# Patient Record
Sex: FEMALE | Race: WHITE
[De-identification: names, ages, dates, MRNs, and addresses within clinical notes are randomized per-mention and may not be internally consistent; named-entity substitution may affect disease eponyms.]

---

## 2018-06-13 ENCOUNTER — HOSPITAL ENCOUNTER (OUTPATIENT)
Dept: HOSPITAL 68 - STS | Age: 63
Setting detail: OBSERVATION
LOS: 1 days | End: 2018-06-14
Attending: SURGERY | Admitting: SURGERY
Payer: COMMERCIAL

## 2018-06-13 VITALS — DIASTOLIC BLOOD PRESSURE: 88 MMHG | SYSTOLIC BLOOD PRESSURE: 126 MMHG

## 2018-06-13 VITALS — BODY MASS INDEX: 30.73 KG/M2 | WEIGHT: 180 LBS | HEIGHT: 64 IN

## 2018-06-13 VITALS — SYSTOLIC BLOOD PRESSURE: 110 MMHG | DIASTOLIC BLOOD PRESSURE: 80 MMHG

## 2018-06-13 VITALS — SYSTOLIC BLOOD PRESSURE: 110 MMHG | DIASTOLIC BLOOD PRESSURE: 82 MMHG

## 2018-06-13 DIAGNOSIS — E78.2: ICD-10-CM

## 2018-06-13 DIAGNOSIS — K21.0: ICD-10-CM

## 2018-06-13 DIAGNOSIS — E03.9: ICD-10-CM

## 2018-06-13 DIAGNOSIS — K44.0: Primary | ICD-10-CM

## 2018-06-13 PROCEDURE — G0378 HOSPITAL OBSERVATION PER HR: HCPCS

## 2018-06-13 PROCEDURE — C1781 MESH (IMPLANTABLE): HCPCS

## 2018-06-13 NOTE — PATIENT DISCHARGE INSTRUCTIONS
Discharge Instructions
 
General Discharge Information
You were seen/treated for:
Hiatal hernia
You had these procedures:
Robotic assisted laparoscopic hiatal hernia repair with mesh and Nissen 
fundoplication on 6/13/18
Watch for these problems:
Increased pain, fever > 101.3, chills, redness, swelling or drainage from 
incisions
Other wound care:
Keep incisions clean and dry
Ok to remove dressings in 48 hours
 
Diet
Continue normal diet: No
Recommended Diet: Fundoplication diet
 
Activity
Full Activity/No Limits: No
Activity Self Limited: Yes
Pounds, do NOT lift more than: 10 (x 4 weeks)
Other activity limits:
No heavy lifting or strenous activity
 
Acute Coronary Syndrome
 
Inclusion Criteria
At DC or during hospital stay patient has or had the following:
ACS DIAGNOSIS No
 
Discharge Core Measures
Meds if any: Prescribed or Continued at Discharge
Meds if any: NOT Prescribed or Continued at Discharge
 
Congestive Heart Failure
 
Inclusion Criteria
At DC or during hospital stay patient has or had the following:
CHF DIAGNOSIS No
 
Discharge Core Measures
Meds if any: Prescribed or Continued at Discharge
Meds if any: NOT Prescribed or Continued at Discharge
 
Cerebrovascular accident
 
Inclusion Criteria
At DC or during hospital stay patient has or had the following:
CVA/TIA Diagnosis No
 
Discharge Core Measures
Meds if any: Prescribed or Continued at Discharge
Meds if any: NOT Prescribed or Continued at Discharge
 
Venous thromboembolism
 
Inclusion Criteria
VTE Diagnosis No
VTE Type NONE
VTE Confirmed by (Test) NONE
 
Discharge Core Measures
- Per Current guidelines, there needs to be overlap
- treatment for the first 5 days of Warfarin therapy.
- If discharged on Warfarin prior to 5 days of
- overlap therapy, the patient will need to be
- assessed for post discharge needs including
- *Post discharge parental anticoagulation
- *Warfarin and/or parental anticoagulation education
- *Follow up date to check INR post discharge
At least 5 days overlap therapy as Inpatient No
Meds if any: Prescribed or Continued at Discharge
Note: Overlap Therapy is Warfarin and Anticoagulant
Meds if any: NOT Prescribed or Continued at Discharge

## 2018-06-13 NOTE — PN- GENERAL SURGERY
Subjective
Subjective:
Post op check:
Patient complaining of discomfort across chest that is moving down to epigastric
region.  Has been present since surgery.  No shortness of breath.  No nausea or 
vomitting.  Has sub q emphysema but feels facial swelling is improving.
 
Objective
Vital Signs and I&Os
Vital Signs
 
 
Date Time Temp Pulse Resp B/P B/P Pulse O2 O2 Flow FiO2
 
     Mean Ox Delivery Rate 
 
06/13 1714 97.9 73 18 110/80  91 Room Air  
 
 
 Intake & Output
 
 
 06/13 1600 06/13 0800 06/13 0000 06/12 1600 06/12 0800 06/12 0000
 
Intake Total      
 
Output Total      
 
Balance      
 
       
 
Patient    180 lb  
 
Weight      
 
 
 
Physical Exam:
General:  Alert and oriented x3, no acute distress
HEENT:  Sub q emphysema palpable across chest, shoulders and face
Cards:  RRR, s1s2
Pulm:  CTA bilaterally, non-labored respiratory effort
Abd: Softly distended, domo-incisional tenderness
Extremiteis:  Neurovascularly inact.  Bilateral calves soft and non-tender
 
Assessment/Plan
Assessment/Plan
This is a 62 year old female, POD 0 s/p robotic assited nissen/hh repair
 
-offirmev atc
-prn oxycodone, morphine
-resume meloxicam on pod 1 if clinically stable
-monitor cp, cxr if worsens, trops negative
-OOB encouraged
-fundoplication diet
Will discuss poc with Dr. Perdue
 
 
Core Measures
 
Venous Thromboembolism
VTE Risk Factors Surgery
No Mechanical VTE Prophylaxis d/t N/A MechProphylax Ordered
No VTE Pharm Prophylaxis d/t NA PharmProphylax ordered

## 2018-06-13 NOTE — ADMISSION CORE MEASURES
Acute Coronary Syndrome (CM)
 
ACS Core Measures
Acute Coronary Syndrome Diagnosis No
 
Congestive Heart Failure (NEW)
 
CHF Core Measures
Congestive Heart Failure Diagnosis No
 
Cerebrovascular Accident AUG17
 
CVA Core Measures
CVA/TIA Diagnosis No
 
Venous Thromboembolism AUG17
 
VTE Core Everardo (View Protocol)
VTE Risk Factors Surgery
No Mechanical VTE Prophylaxis d/t N/A MechProphylax Ordered
No VTE Pharm Prophylaxis d/t NA PharmProphylax ordered
 
Problem List
 
As ranked by this Provider
includes Assessment & Plan
 1. GERD with esophagitis
 
 2. Hiatal hernia with obstruction but no gangrene
 
 
HOME MEDS
Home Med List
Atorvastatin Calcium 20 MG TABLET   1 TAB PO DAILY CHOLESTEROL  (Reported)
Cholecalciferol (Vitamin D3) (Vitamin D3) (Unknown Strength) CAPSULE   (Unknown 
Dose) PO DAILY SUPPLEMENT  (Reported)
Meloxicam 15 MG TABLET   1 TAB PO DAILY PAIN/INFLAMMATION  (Reported)
Pantoprazole Sodium 40 MG TABLET.DR   1 TAB PO BID GI  (Reported)
Vit C/E/Zn/Coppr/Lutein/Zeaxan (Preservision Areds 2 Softgel) 250-200-40 CAPSULE
  1 CAP PO BID SUPPLEMENT  (Reported)

## 2018-06-13 NOTE — OPERATIVE REPORT
Operative/Inv Procedure Report
Surgery Date: 06/13/18
Name of Procedure:
Robotic assisted laparoscopic hiatal hernia repair with mesh and Nissen 
fundoplication
Pre-Operative Diagnosis:
Hiatal hernia with obstruction
 
GERD with esophagitis
Post-Operative Diagnosis:
Same same
Estimated Blood Loss: scant
Surgeon/Assistant:
Iron JOHNSON,Yobani WOO/Obey KOCH
 
Anesthesia: general endotracheal tube
Implants:
[place] bio a
 
Operative/Procedure Note
Note:
After informed consent patient brought to the operating room laid supine.  
General anesthesia was obtained and her abdomen was prepped and draped.  
Pneumoperitoneum was achieved through a left upper quadrant varies needle.  A 8 
mm port was placed to the left of the periumbilical region.  The abdomen was 
explored.  There was some pneumatosis of the mesentery of the small bowel in the
left upper quadrant.  The varies needle was removed.  There is no bowel injury. 
2, 8 mm ports were placed in the left lower abdomen parallel plane of the 
diaphragm under vision.  A 12 mm port was placed in the right midabdomen under 
direct vision a camera.  A 5 mm Carisa retractor was placed through the 
epigastric region under direct vision a camera.  The liver was retracted and 
fixed to the bed.  She was placed in reverse Trendelenburg, 15.  Robot was 
docked and targeted.  I then broke scrub and went to the console.  There is a 
very large mixed paraesophageal hernia containing the entire fundus and most of 
the body of the stomach and most of the omentum.  Began dissection on the right 
allan.  The peritoneum overlying it was taken down with scissor cautery.  We came
anteriorly and got into a plane around the hernia sac.  We circumferentially 
dissected the hernia sac and delivered the sac inferiorly.  Then dissected 
posterior and created a plane behind the esophagus.  Penrose drain was then 
placed and tied to itself and aid in dissection in the GE junction.  The 
esophagus was then dissected from the mediastinum using scissor cautery.  The 
vagus nerves were identified and preserved.  Once the esophagus was mobilized as
far as we can go, we turned our attention down to the GE junction.  It was 
difficult to see where it was so the sac was excised with vessel sealer.  The GE
junction was well below the diaphragm.  The short gastric vessels were then 
taken down with the vessel sealer.  At this point the diaphragmatic crura were 
closed with a running 0 V lock suture, nonabsorbable.  At the apex of her 
closure a 0 Ethibond suture was placed in a figure-of-eight fashion for 
additional strength.  The fundus was then placed posterior and shoeshine 
maneuver performed.  The fundus was attached to itself anteriorly with a 0 V 
lock suture, nonabsorbable.  This aid in placement of the fundus then allowed 2 
more sutures to be placed inferiorly using interrupted 2-0 Vicryl incorporating 
the anterior portion of the esophagus to keep the fundoplication fixated.  A 
piece of [place] bio a mesh was then placed in the cavity and positioned over the 
diaphragmatic closure.  All needles and Penrose drains were then removed.  The 
sac was removed and passed off the field.  Liver retractor was removed and ports
delivered.  The fascia in the 12 mm site was closed with 0 Vicryl suture.  Skin 
incisions closed with 4-0 Vicryl.  Steri-Strips and sterile dressing applied.  
Sponge and needle counts are correct
CC:
Michele JOHNSON,Mirtha; Will JOHNSON,Spencer

## 2018-06-14 VITALS — SYSTOLIC BLOOD PRESSURE: 128 MMHG | DIASTOLIC BLOOD PRESSURE: 76 MMHG

## 2018-06-14 VITALS — DIASTOLIC BLOOD PRESSURE: 90 MMHG | SYSTOLIC BLOOD PRESSURE: 140 MMHG

## 2018-06-14 VITALS — SYSTOLIC BLOOD PRESSURE: 124 MMHG | DIASTOLIC BLOOD PRESSURE: 86 MMHG

## 2018-06-14 VITALS — DIASTOLIC BLOOD PRESSURE: 80 MMHG | SYSTOLIC BLOOD PRESSURE: 126 MMHG

## 2018-06-14 LAB
ABSOLUTE GRANULOCYTE CT: 10.2 /CUMM (ref 1.4–6.5)
BASOPHILS # BLD: 0 /CUMM (ref 0–0.2)
BASOPHILS NFR BLD: 0.1 % (ref 0–2)
EOSINOPHIL # BLD: 0 /CUMM (ref 0–0.7)
EOSINOPHIL NFR BLD: 0.1 % (ref 0–5)
ERYTHROCYTE [DISTWIDTH] IN BLOOD BY AUTOMATED COUNT: 16.4 % (ref 11.5–14.5)
GRANULOCYTES NFR BLD: 85 % (ref 42.2–75.2)
HCT VFR BLD CALC: 33.5 % (ref 37–47)
LYMPHOCYTES # BLD: 0.8 /CUMM (ref 1.2–3.4)
MCH RBC QN AUTO: 28 PG (ref 27–31)
MCHC RBC AUTO-ENTMCNC: 33.3 G/DL (ref 33–37)
MCV RBC AUTO: 84.4 FL (ref 81–99)
MONOCYTES # BLD: 0.9 /CUMM (ref 0.1–0.6)
PLATELET # BLD: 201 /CUMM (ref 130–400)
PMV BLD AUTO: 8 FL (ref 7.4–10.4)
RED BLOOD CELL CT: 3.97 /CUMM (ref 4.2–5.4)
WBC # BLD AUTO: 12 /CUMM (ref 4.8–10.8)

## 2018-06-14 NOTE — RADIOLOGY REPORT
EXAMINATION:
XR CHEST
 
CLINICAL INFORMATION:
Shortness of breath. History of Herbert fundoplication. No subcutaneous
emphysema.
 
COMPARISON:
None
 
TECHNIQUE:
2 views of the chest were obtained. Examination limited secondary to
extensive subcutaneous emphysema.
 
FINDINGS:
Cardiac silhouette is nondilated. There is widening of the superior
mediastinum, potentially related to recent surgery or patient angulation.
 
The lungs are adequately aerated. Lung detail is obscured secondary to
extensive subcutaneous emphysema. No gross lobar consolidation or pleural
effusion.
 
Degenerative changes of the spine.
 
IMPRESSION:
Extensive subcutaneous emphysema is present which limits fine detail. There
is widening of the superior mediastinum which is nonspecific but potentially
related to recent surgery or patient angulation. Clinical correlation is
recommended. Further evaluation may be obtained with CT if indicated.

## 2018-08-20 ENCOUNTER — HOSPITAL ENCOUNTER (INPATIENT)
Dept: HOSPITAL 68 - SDA | Age: 63
LOS: 2 days | DRG: 328 | End: 2018-08-22
Attending: SURGERY | Admitting: SURGERY
Payer: COMMERCIAL

## 2018-08-20 VITALS — HEIGHT: 63.78 IN | WEIGHT: 178.57 LBS | BODY MASS INDEX: 30.86 KG/M2

## 2018-08-20 VITALS — SYSTOLIC BLOOD PRESSURE: 136 MMHG | DIASTOLIC BLOOD PRESSURE: 80 MMHG

## 2018-08-20 VITALS — SYSTOLIC BLOOD PRESSURE: 128 MMHG | DIASTOLIC BLOOD PRESSURE: 62 MMHG

## 2018-08-20 DIAGNOSIS — K44.0: Primary | ICD-10-CM

## 2018-08-20 DIAGNOSIS — E78.5: ICD-10-CM

## 2018-08-20 DIAGNOSIS — Z96.643: ICD-10-CM

## 2018-08-20 DIAGNOSIS — K21.9: ICD-10-CM

## 2018-08-20 PROCEDURE — 0DNU4ZZ RELEASE OMENTUM, PERCUTANEOUS ENDOSCOPIC APPROACH: ICD-10-PCS | Performed by: SURGERY

## 2018-08-20 PROCEDURE — 2NASP: CPT

## 2018-08-20 PROCEDURE — 0DQ44ZZ REPAIR ESOPHAGOGASTRIC JUNCTION, PERCUTANEOUS ENDOSCOPIC APPROACH: ICD-10-PCS | Performed by: SURGERY

## 2018-08-20 PROCEDURE — 0WUF4JZ SUPPLEMENT ABDOMINAL WALL WITH SYNTHETIC SUBSTITUTE, PERCUTANEOUS ENDOSCOPIC APPROACH: ICD-10-PCS | Performed by: SURGERY

## 2018-08-20 PROCEDURE — C1781 MESH (IMPLANTABLE): HCPCS

## 2018-08-20 PROCEDURE — 8E0W4CZ ROBOTIC ASSISTED PROCEDURE OF TRUNK REGION, PERCUTANEOUS ENDOSCOPIC APPROACH: ICD-10-PCS | Performed by: SURGERY

## 2018-08-20 NOTE — OPERATIVE REPORT
Operative/Inv Procedure Report
Surgery Date: 08/20/18
Name of Procedure:
1.  Redo robotic assisted laparoscopic hiatal hernia repair with mesh and Nissen
fundoplication
 
2.  Robotic assisted laparoscopic Zohra gastroplasty
Pre-Operative Diagnosis:
Recurrent hiatal hernia with GERD
Post-Operative Diagnosis:
Same
Estimated Blood Loss: less than 50ml
Surgeon/Assistant:
Iron JOHNSON,Yobani WOO/Rabia KOCH
 
Anesthesia: general endotracheal tube
Implants:
[place] bio a
Operative Indication:
63-year-old woman who is 2 months post robotic assisted laparoscopic hiatal 
hernia repair with mesh and Nissen fundoplication.  She had early recurrence of 
GERD and postprandial chest pain.  Workup shows recurrent hiatal hernia with 
"slipped Nissen".
 
Operative/Procedure Note
Note:
After consent patient brought to the operating room laid supine.  General 
anesthesia was obtained and her abdomen was prepped and draped.  
Pneumoperitoneum was achieved through the left upper quadrant Veress needle at 
Lucero's point.  An 8 mm port was then placed through the pre-existing scar in 
the left midabdomen.  The abdomen was explored.  An 8 mm port was placed in the 
left paramedian area.  A 12 mm port was placed in the right upper quadrant.  And
a 12 mm port was placed in the left lateral position.  Finally through a 5 mm 
incision in the epigastrium, Carisa retractor was placed and liver edge 
elevated.  The robot was then docked and targeted.  Instruments were placed 
under direct vision.  I then broke scrub and went to the console.  The abdomen 
was explored.  There was evidence of recurrent hiatal hernia with fundoplication
down over the body of the stomach.  There were adhesions to the liver.  We began
by dissecting free the hiatus using scissor cautery.  This was quite tedious, 
taking approximately 3 hours of dissection.  We can went back and forth around 
the crura to delineate the stomach and peel it away from the previously placed 
[place] bio a mesh that was incorporated into the fascial/diaphragmatic tissues.  
Eventually were able to get around the hiatus and Penrose drain was placed 
around the GE junction to aid in retraction.  The fundoplication was taken down 
by cutting the permanent sutures that were seen and freeing it up from the 
stomach.  Eventually were able to push it back behind and reestablish the normal
position of the stomach.  We then circumferentially dissected the hiatus with 
scissor cautery.  This too was quite tedious as care was taken to avoid injury 
to the esophagus.  Eventually able to get into the mediastinum and identify the 
esophagus.  It was freed up safely as possible.  After completing as much 
dissection as possible in the mediastinum, it appeared that the GE junction was 
right at the diaphragm.  I then proceeded to perform Zohra gastroplasty.  A 44 
[place] bougie was placed under direct vision.  A wedge of fundus was then taken 
at the angle of Hiss using 3 loads of a green robotic stapler cartridge.  The 
result was a lengthening of a "ángel-esophagus" of approximately 2 cm.  There was 
closure of the diaphragm from previous surgery, but it was not quite closed 
enough.  It was then closed further in a posterior fashion using running 0 V 
lock permanent suture.  The fundus was then passed posterior and shoeshine 
maneuver performed.  A 360 degree wrap was then performed with interrupted 2-0 
Ethibond sutures, 2 of which incorporated the anterior portion of the esophagus 
to keep it centered.  The bougie was removed.  A piece of pork [place] bio a mesh 
was then placed posterior to the fundoplication.  Once we have for the place of 
the mesh we then extracted the sutures and gastrectomy specimen.  The renal 
cavity was irrigated with normal saline.  Liver edge was then allowed to fall 
down ports extracted and robot undocked.  The fascia at the 2 12 mm port sites 
were closed with interrupted 0 Vicryl suture.  Skin incisions closed with 4-0 
Vicryl.  Steri-Strips and sterile dressing applied.  Sponge needle counts are 
correct
CC:
Michele JOHNSON,Mirtha

## 2018-08-20 NOTE — ADMISSION CORE MEASURES
Acute Coronary Syndrome (CM)
 
ACS Core Measures
Acute Coronary Syndrome Diagnosis No
 
Congestive Heart Failure (NEW)
 
CHF Core Measures
Congestive Heart Failure Diagnosis No
 
Cerebrovascular Accident AUG17
 
CVA Core Measures
CVA/TIA Diagnosis No
 
Venous Thromboembolism AUG17
 
VTE Core Everardo (View Protocol)
VTE Risk Factors Surgery
No Mechanical VTE Prophylaxis d/t N/A MechProphylax Ordered
No VTE Pharm Prophylaxis d/t NA PharmProphylax ordered
 
Problem List
 
As ranked by this Provider
includes Assessment & Plan
 1. Hiatal hernia with GERD
 
 
HOME MEDS
Home Med List
Atorvastatin Calcium 20 MG TABLET   1 TAB PO DAILY CHOLESTEROL  (Reported)
Meloxicam 15 MG TABLET   1 TAB PO DAILY PAIN/INFLAMMATION  (Reported)
Pantoprazole Sodium (Protonix) 20 MG TABLET.DR   1 TAB PO DAILY GI PROTECTION

## 2018-08-20 NOTE — PN- GENERAL SURGERY
Subjective
Subjective:
POC
 
Feeling well, no nausea, no vomiting.  no cp/sob, some shoulder pain.  +oob to 
bathroom, +void.  thirsty. abd pain controlled
 
Objective
Vital Signs and I&Os
see emr
Physical Exam:
gen- nad
card- s1s2
pulm- no audible wheeze
abd- incisions dressed, cdi.  ttp upper abd. obese, soft.
ect- calves soft nt bl, alps on
 
Assessment/Plan
Assessment/Plan
A- POD0 sp redo robotic HH repair/nissen/becca gastroplasty, for recurrent 
hiatal hernia with gerd, stable
 
P-
NPO
IVF
IV meds
ancef x23h postop 
hep so, alps, oob, ambulate
UGI in am
 
 
 
 
Core Measures
 
Venous Thromboembolism
VTE Risk Factors Surgery
No Mechanical VTE Prophylaxis d/t N/A MechProphylax Ordered
No VTE Pharm Prophylaxis d/t NA PharmProphylax ordered

## 2018-08-21 VITALS — DIASTOLIC BLOOD PRESSURE: 60 MMHG | SYSTOLIC BLOOD PRESSURE: 108 MMHG

## 2018-08-21 VITALS — SYSTOLIC BLOOD PRESSURE: 100 MMHG | DIASTOLIC BLOOD PRESSURE: 70 MMHG

## 2018-08-21 VITALS — SYSTOLIC BLOOD PRESSURE: 120 MMHG | DIASTOLIC BLOOD PRESSURE: 80 MMHG

## 2018-08-21 LAB
ABSOLUTE GRANULOCYTE CT: 5.2 /CUMM (ref 1.4–6.5)
BASOPHILS # BLD: 0 /CUMM (ref 0–0.2)
BASOPHILS NFR BLD: 0.4 % (ref 0–2)
EOSINOPHIL # BLD: 0.4 /CUMM (ref 0–0.7)
EOSINOPHIL NFR BLD: 5.1 % (ref 0–5)
ERYTHROCYTE [DISTWIDTH] IN BLOOD BY AUTOMATED COUNT: 15.7 % (ref 11.5–14.5)
GRANULOCYTES NFR BLD: 75.9 % (ref 42.2–75.2)
HCT VFR BLD CALC: 30 % (ref 37–47)
LYMPHOCYTES # BLD: 0.7 /CUMM (ref 1.2–3.4)
MCH RBC QN AUTO: 29.5 PG (ref 27–31)
MCHC RBC AUTO-ENTMCNC: 33.8 G/DL (ref 33–37)
MCV RBC AUTO: 87.4 FL (ref 81–99)
MONOCYTES # BLD: 0.5 /CUMM (ref 0.1–0.6)
PLATELET # BLD: 208 /CUMM (ref 130–400)
PMV BLD AUTO: 7.5 FL (ref 7.4–10.4)
RED BLOOD CELL CT: 3.43 /CUMM (ref 4.2–5.4)
WBC # BLD AUTO: 6.9 /CUMM (ref 4.8–10.8)

## 2018-08-21 NOTE — RADIOLOGY REPORT
EXAMINATION:
FL UPPER GI SERIES
 
CLINICAL INFORMATION:
Status post redo Nissen fundoplication
 
COMPARISON:
None
 
TECHNIQUE:
A single contrast upper GI series with fluoroscopy and spot imaging was
performed. The patient ingested dilute Gastrografin without difficulty and
was evaluated in the upright positions.
 
FINDINGS:
Normal swallowing reflex. There is some esophageal distention present, with
dysmotility. The Gastrografin demonstrates normal passage through the
esophagus into the stomach. No discrete extraluminal contrast or leakage of
contrast is identified. There is nondistention of the proximal stomach,
presumably postsurgical. There is contrast seen extending into the duodenum.
 
FLUOROSCOPY TIME:
1 minute 56 seconds
 
NUMBER OF IMAGES:
122 images
 
IMPRESSION:
No extraluminal contrast is identified. Esophageal dysmotility. See details
above.

## 2018-08-22 VITALS — DIASTOLIC BLOOD PRESSURE: 72 MMHG | SYSTOLIC BLOOD PRESSURE: 110 MMHG

## 2018-08-22 NOTE — PATIENT DISCHARGE INSTRUCTIONS
Discharge Instructions
 
General Discharge Information
You were seen/treated for:
Repair of Nissen Fundoplication
You had these procedures:
Repair of Nissen Fundoplication and Gastroplasty
Watch for these problems:
Redness, swelling or unusual drainage from incisions, increased pain, nausea, 
vomiting, excessive diarrhea, or no bowel movements, or any other problems, 
questions or concerns.
Do not soak the wound: Yes
Daily wet to dry dressings: No
No bath, but you may shower: Yes
Other wound care:
Remove bandages tomorrow, white strips will fall off on their own
 
Diet
Continue normal diet: No
Recommended Diet: Post Nissen Diet
Additional DIET Information:
Nothing too hot or cold, small frequent meals, no carbonation
 
Activity
Full Activity/No Limits: No
Activity Self Limited: Yes
Pounds, do NOT lift more than: 10 (x 2 weeks)
Activity Limited to: Weight bear as tolerated
 
Acute Coronary Syndrome
 
Inclusion Criteria
At DC or during hospital stay patient has or had the following:
ACS DIAGNOSIS No
 
Discharge Core Measures
Meds if any: Prescribed or Continued at Discharge
Meds if any: NOT Prescribed or Continued at Discharge
 
Congestive Heart Failure
 
Inclusion Criteria
At DC or during hospital stay patient has or had the following:
CHF DIAGNOSIS No
 
Discharge Core Measures
Meds if any: Prescribed or Continued at Discharge
Meds if any: NOT Prescribed or Continued at Discharge
 
Cerebrovascular accident
 
Inclusion Criteria
At DC or during hospital stay patient has or had the following:
CVA/TIA Diagnosis No
 
Discharge Core Measures
Meds if any: Prescribed or Continued at Discharge
Meds if any: NOT Prescribed or Continued at Discharge
 
Venous thromboembolism
 
Inclusion Criteria
VTE Diagnosis No
VTE Type NONE
VTE Confirmed by (Test) NONE
 
Discharge Core Measures
- Per Current guidelines, there needs to be overlap
- treatment for the first 5 days of Warfarin therapy.
- If discharged on Warfarin prior to 5 days of
- overlap therapy, the patient will need to be
- assessed for post discharge needs including
- *Post discharge parental anticoagulation
- *Warfarin and/or parental anticoagulation education
- *Follow up date to check INR post discharge
At least 5 days overlap therapy as Inpatient No
Meds if any: Prescribed or Continued at Discharge
Note: Overlap Therapy is Warfarin and Anticoagulant
Meds if any: NOT Prescribed or Continued at Discharge

## 2018-08-22 NOTE — SURG SHORT-STAY <48HRS DIS SUM
Visit Information
 
Visit Dates
Admission Date:
08/20/18
 
Discharge Date:
8/22/18
 
 
Surgical Short Stay DC Summary
Admission Diagnosis:
Recurrent hiatal hernia
Final Diagnosis:
same
Procedure(s):
Robotic redo hiatal hernia repair with mesh, Nissen fundoplication and Zohra 
gastroplasty
Summary/Significant Findings:
none
Condition at Discharge:
good
Discharge Disposition: home or self care
Discharge instructions provided to patient/family: Yes
Post discharge follow-up plan:
2 weeks. Fundoplication diet progression. 
Copies to:
Michele JOHNSON,Mirtha

## 2018-08-22 NOTE — PN- STUDENT
Mariana Whitehead 08/22/18 0710:
Subjective
Subjective:
No acute overnight events. Pt had an episode of diarrhea this morning, passing 
flatus.  She states that she is in pain but it is tolerable. She is experiencing
no dysphagia, reflux and is tolerating her diet at this time. IV fluids are 
still running, I encouraged  her to drink more fluids PO. Pt has been oob and 
ambulating. Denies any cp. Does have some difficutly with deep breaths d/t post-
op pain.
 
Objective
Objective:
Gen: O&Ax3, laying in bed, nad 
Lungs: CTA 
Heart: RRR, s1 and s2 normal 
Abdomen: soft, tender on left side near incision sites, hypoactive bs, non-
distended, bandaids in place over incisions- all are c/d/i
LE: calves soft, nontender 
Skin: warm/dry
 
Assessment/Plan
Assessment:
This is a 62 yo female s/p redo robotic HH repair/nissen/becca gastroplasty, 
for recurrent hiatal hernia with gerd, this morning she is stable. 
Plan:
-If tolerating diet and getting good PO intake, DC IV fluids later today
-UGI done yesterday am showed some esophageal distention and dysmotility, but no
extraluminal contrast was identified 
-Encourage oob,ambulation 
-Pain control as ordered 
-No mahmood, voiding w/o difficulty 
-periop abx given 
-eliquis, ALPs for dvt ppx
 
Yobani Perdue MD 08/22/18 3782:
Attending MD Review Statement
 
Attending Sign Off
Other Findings:
as above. tolerating diet. d/c ivf. shoulder pain from positioning in OR. plan 
for discharge today.

## 2018-08-22 NOTE — PN- GENERAL SURGERY
**See Addendum**
Subjective
Subjective:
Patient doing fairly well this am. Reports left shoulder and incisional pain. 
Had some diarrhea yesterday and this am. No n/v, tolerating post nissen stage 1 
diet. OOB and voiding without difficulty. Pain fairly well controlled, has not 
tried PO pain medication. No other issues or complaints. No other concerns per 
nursing. 
 
Objective
Vital Signs and I&Os
Vital Signs
 
 
Date Time Temp Pulse Resp B/P B/P Pulse O2 O2 Flow FiO2
 
     Mean Ox Delivery Rate 
 
08/22 0548 98.3 52 20 110/72  92 Room Air  
 
08/21 2219 98.6 68 20 120/80  93   
 
08/21 1600       Room Air  
 
08/21 1351 97.8 65 20 108/60  92 Room Air  
 
08/21 0800       Room Air  
 
 
 Intake & Output
 
 
 08/22 0800 08/22 0000 08/21 1600 08/21 0800 08/21 0000 08/20 1600
 
Intake Total  600 500 
 
Output Total 600    350 
 
Balance  600 150 
 
       
 
Intake,  600 800 600 500 
 
Intake, Oral   880 0 0 
 
Number     0 
 
Bowel      
 
Movements      
 
Output, Urine 600    350 
 
Patient     179 lb 
 
Weight      
 
 
 
Physical Exam:
General: A, A, NAD
 
Abdomen: Obese, soft, ND, appropriately ttp, bandages c/d/i with steri strips in
place
 
Extremities: No clubbing, cyanosis or edema
Current Medications:
 Current Medications
 
 
  Sig/Alfonso Start time  Last
 
Medication Dose Route Stop Time Status Admin
 
Acetaminophen 1,000 MG Q6P PRN 08/20 1715 AC 08/22
 
  IV   0106
 
Cefazolin Sodium 2 GM IQ8 08/21 0000 DC 08/21
 
N/A 1 UNIT IV 08/21 0829  0759
 
Dextrose/Sodium  1,000 ML .Q10H 08/20 1715 AC 08/22
 
Chloride  IV   0057
 
Heparin Sodium  5,000 UNIT Q8 08/20 2200 AC 08/22
 
(Porcine)  SC   0543
 
Hydromorphone HCl 2 MG Q4P PRN 08/22 0730 AC 08/22
 
  PO   0740
 
Morphine Sulfate 2 MG Q3P PRN 08/20 1745 AC 08/20
 
  IV   2247
 
Morphine Sulfate 4 MG Q3P PRN 08/20 1745 AC 08/21
 
  IV   2008
 
Ondansetron HCl 4 MG Q6P PRN 08/20 1800 AC 08/20
 
  IV   2247
 
Patient Medication  1 ED ONE ONE 08/21 1415 DC 08/21
 
Teaching  ED 08/21 1416  1419
 
 
 
 
Results
Last 48 Hours of Labs:
 Laboratory Tests
 
 
 08/21
 
 0837
 
Chemistry 
 
  Sodium (137 - 145 mmol/L) 137
 
  Potassium (3.5 - 5.1 mmol/L) 3.9
 
  Chloride (98 - 107 mmol/L) 106
 
  Carbon Dioxide (22 - 30 mmol/L) 27
 
  Anion Gap (5 - 16) 3  L
 
  BUN (7 - 17 mg/dL) 14
 
  Creatinine (0.5 - 1.0 mg/dL) 0.6
 
  Estimated GFR (>60 ml/min) > 60
 
  BUN/Creatinine Ratio (7 - 25 %) 23.3
 
Hematology 
 
  CBC w Diff NO MAN DIFF REQ
 
  WBC (4.8 - 10.8 /CUMM) 6.9
 
  RBC (4.20 - 5.40 /CUMM) 3.43  L
 
  Hgb (12.0 - 16.0 G/DL) 10.1  L
 
  Hct (37 - 47 %) 30.0  L
 
  MCV (81.0 - 99.0 FL) 87.4
 
  MCH (27.0 - 31.0 PG) 29.5
 
  MCHC (33.0 - 37.0 G/DL) 33.8
 
  RDW (11.5 - 14.5 %) 15.7  H
 
  Plt Count (130 - 400 /CUMM) 208
 
  MPV (7.4 - 10.4 FL) 7.5
 
  Gran % (42.2 - 75.2 %) 75.9  H
 
  Lymphocytes % (20.5 - 51.1 %) 10.7  L
 
  Monocytes % (1.7 - 9.3 %) 7.9
 
  Eosinophils % (0 - 5 %) 5.1  H
 
  Basophils % (0.0 - 2.0 %) 0.4
 
  Absolute Granulocytes (1.4 - 6.5 /CUMM) 5.2
 
  Absolute Lymphocytes (1.2 - 3.4 /CUMM) 0.7  L
 
  Absolute Monocytes (0.10 - 0.60 /CUMM) 0.5
 
  Absolute Eosinophils (0.0 - 0.7 /CUMM) 0.4
 
  Absolute Basophils (0.0 - 0.2 /CUMM) 0
 
 
 
 
Assessment/Plan
Assessment/Plan
This is a 64 yo female who is POD #2 s/p Robotic Repair of Nissen Fundoplication
and Gastroplasty. Plan for today is to convert to PO pain medication. EKG for 
Left shoulder pain. Ambulate/IS, GI/DVT Px. Continue post nissen stg 1 diet. 
Nutrition education prior to admission on home diet. Likely DC home later today.
Will d/w Dr. Perdue.
Problem List:
 1. Hiatal hernia with obstruction but no gangrene
 
 2. Hiatal hernia with GERD
 
 3. S/P Nissen fundoplication (without gastrostomy tube) procedure
 
 
Core Measures
 
Venous Thromboembolism
VTE Risk Factors Surgery
No Mechanical VTE Prophylaxis d/t N/A MechProphylax Ordered
No VTE Pharm Prophylaxis d/t NA PharmProphylax ordered